# Patient Record
Sex: MALE | Race: OTHER | HISPANIC OR LATINO | Employment: FULL TIME | URBAN - METROPOLITAN AREA
[De-identification: names, ages, dates, MRNs, and addresses within clinical notes are randomized per-mention and may not be internally consistent; named-entity substitution may affect disease eponyms.]

---

## 2023-10-30 ENCOUNTER — APPOINTMENT (OUTPATIENT)
Dept: RADIOLOGY | Facility: IMAGING CENTER | Age: 33
End: 2023-10-30
Attending: FAMILY MEDICINE
Payer: COMMERCIAL

## 2023-10-30 ENCOUNTER — OFFICE VISIT (OUTPATIENT)
Dept: URGENT CARE | Facility: CLINIC | Age: 33
End: 2023-10-30
Payer: COMMERCIAL

## 2023-10-30 VITALS
RESPIRATION RATE: 13 BRPM | SYSTOLIC BLOOD PRESSURE: 108 MMHG | WEIGHT: 247.3 LBS | TEMPERATURE: 97.5 F | DIASTOLIC BLOOD PRESSURE: 70 MMHG | HEART RATE: 64 BPM | BODY MASS INDEX: 36.63 KG/M2 | OXYGEN SATURATION: 100 % | HEIGHT: 69 IN

## 2023-10-30 DIAGNOSIS — M62.838 TRAPEZIUS MUSCLE SPASM: ICD-10-CM

## 2023-10-30 DIAGNOSIS — M25.511 ACUTE PAIN OF RIGHT SHOULDER: ICD-10-CM

## 2023-10-30 PROCEDURE — 73030 X-RAY EXAM OF SHOULDER: CPT | Mod: TC,RT | Performed by: RADIOLOGY

## 2023-10-30 PROCEDURE — 3078F DIAST BP <80 MM HG: CPT | Performed by: FAMILY MEDICINE

## 2023-10-30 PROCEDURE — 3074F SYST BP LT 130 MM HG: CPT | Performed by: FAMILY MEDICINE

## 2023-10-30 PROCEDURE — 99203 OFFICE O/P NEW LOW 30 MIN: CPT | Performed by: FAMILY MEDICINE

## 2023-10-30 RX ORDER — CYCLOBENZAPRINE HCL 10 MG
10 TABLET ORAL 3 TIMES DAILY PRN
Qty: 20 TABLET | Refills: 0 | Status: SHIPPED | OUTPATIENT
Start: 2023-10-30

## 2023-10-30 NOTE — LETTER
Called and spoke with patient. Informed him of the below results and information. Patient verbalized understanding.    October 30, 2023         Patient: Lizandro Vences   YOB: 1990   Date of Visit: 10/30/2023           To Whom it May Concern:    Lizandro Vences was seen in my clinic on 10/30/2023. Please excuse from work today.    Sincerely,           Maurice Mane M.D.  Electronically Signed

## 2023-10-31 ASSESSMENT — ENCOUNTER SYMPTOMS
WEIGHT LOSS: 0
MYALGIAS: 0
EYE DISCHARGE: 0
VOMITING: 0
NAUSEA: 0
EYE REDNESS: 0

## 2023-11-01 NOTE — PROGRESS NOTES
"Subjective     Lizandro Vences is a 32 y.o. male who presents with Neck Pain (Patient states that neck and shoulder pain have been going on for about 3 days. States that he thought it was due to how he sleeps. This morning his shoulder was hurting a lot and was unable to go to work. ) and Shoulder Pain            3 days right neck and shoulder pain.  Moderate to severe.  Worse with movement of head and right upper extremity.  No trauma.  No fever.  No myelopathy symptoms.  No prior injury or surgery.  No other aggravating alleviating factors.        Review of Systems   Constitutional:  Negative for malaise/fatigue and weight loss.   Eyes:  Negative for discharge and redness.   Gastrointestinal:  Negative for nausea and vomiting.   Musculoskeletal:  Negative for joint pain and myalgias.   Skin:  Negative for itching and rash.              Objective     /70   Pulse 64   Temp 36.4 °C (97.5 °F) (Temporal)   Resp 13   Ht 1.753 m (5' 9\")   Wt 112 kg (247 lb 4.8 oz)   SpO2 100%   BMI 36.52 kg/m²      Physical Exam  Constitutional:       General: He is not in acute distress.     Appearance: He is well-developed.   HENT:      Head: Normocephalic and atraumatic.   Eyes:      Conjunctiva/sclera: Conjunctivae normal.   Neck:      Comments: Tender and spasm right paracervical and trapezius musculature.  No midline bony tenderness or step-off.  Pain reproduced with right head rotation.  Pulmonary:      Breath sounds: Normal breath sounds.   Musculoskeletal:      Cervical back: Normal range of motion and neck supple.      Comments: Right shoulder diffusely tender.  No deformity.  Pain reproduced with abduction past 90 degrees.  Distal neurovascular intact.   Skin:     General: Skin is warm and dry.      Findings: No rash.   Neurological:      Mental Status: He is alert.                             Assessment & Plan      Xray: negative per radiology    1. Acute pain of right shoulder    - DX-SHOULDER 2+ RIGHT; " Future    2. Trapezius muscle spasm    - cyclobenzaprine (FLEXERIL) 10 mg Tab; Take 1 Tablet by mouth 3 times a day as needed for Muscle Spasms.  Dispense: 20 Tablet; Refill: 0    Differential diagnosis, natural history, supportive care, and indications for immediate follow-up were discussed.

## 2023-11-14 ENCOUNTER — OCCUPATIONAL MEDICINE (OUTPATIENT)
Dept: URGENT CARE | Facility: CLINIC | Age: 33
End: 2023-11-14
Payer: OTHER MISCELLANEOUS

## 2023-11-14 VITALS
DIASTOLIC BLOOD PRESSURE: 64 MMHG | HEIGHT: 69 IN | OXYGEN SATURATION: 98 % | HEART RATE: 62 BPM | RESPIRATION RATE: 16 BRPM | BODY MASS INDEX: 36.58 KG/M2 | WEIGHT: 247 LBS | TEMPERATURE: 97.3 F | SYSTOLIC BLOOD PRESSURE: 116 MMHG

## 2023-11-14 DIAGNOSIS — M62.838 TRAPEZIUS MUSCLE SPASM: ICD-10-CM

## 2023-11-14 DIAGNOSIS — S46.912D SHOULDER STRAIN, LEFT, SUBSEQUENT ENCOUNTER: ICD-10-CM

## 2023-11-14 PROCEDURE — 3078F DIAST BP <80 MM HG: CPT | Performed by: PHYSICIAN ASSISTANT

## 2023-11-14 PROCEDURE — 99213 OFFICE O/P EST LOW 20 MIN: CPT | Performed by: PHYSICIAN ASSISTANT

## 2023-11-14 PROCEDURE — 3074F SYST BP LT 130 MM HG: CPT | Performed by: PHYSICIAN ASSISTANT

## 2023-11-14 ASSESSMENT — ENCOUNTER SYMPTOMS: NECK PAIN: 1

## 2023-11-14 NOTE — PROGRESS NOTES
"Hernandez Vences is a 32 y.o. male who presents with Neck Pain (WC NEW, DOI 10/28/23)      Initial DOI 10/28/2023: Patient states he was lifting overweight packages and causing discomfort to his neck.  Patient was seen at regular urgent care visit on October 30 and had negative x-rays at that time.  He had most relaxer prescribed at that time and states he has been noticing some relief with.  Has not been taking any other medication at this time.     HPI  Patient presents today for evaluation of work-related injury as described above.  Review of Systems   Musculoskeletal:  Positive for neck pain.     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   Constitutional:       General: He is not in acute distress.     Appearance: Normal appearance. He is well-developed. He is not ill-appearing or toxic-appearing.   HENT:      Head: Normocephalic and atraumatic.      Right Ear: Hearing normal.      Left Ear: Hearing normal.   Cardiovascular:      Rate and Rhythm: Normal rate.   Pulmonary:      Effort: Pulmonary effort is normal.   Musculoskeletal:        Arms:       Comments: As described below.   Skin:     General: Skin is warm and dry.   Neurological:      Mental Status: He is alert.      Coordination: Coordination normal.   Psychiatric:         Mood and Affect: Mood normal.         Patient has pain with movement of the right upper extremity above shoulder height.  Neurovascular intact distally.  5 of 5  strength.  Tenderness to palpation to the right trapezius area.  Negative empty can test.            Assessment & Plan   1. " Trapezius muscle spasm      2. Shoulder strain, left, subsequent encounter    Would suggest light duty restrictions for the next week.  Would recommend use over-the-counter ibuprofen or Tylenol per 's instructions as well as heat or ice to the area intermittently.  Would also suggest topical IcyHot or Salonpas patches.  Gentle stretching and range of motion exercises recommended.  Would suggest avoiding any use of the right upper extremity above shoulder height and avoid lifting anything over 10 pounds.  Follow-up in 1 week for reevaluation at that time.     Please note that this dictation was created using voice recognition software. I have made every reasonable attempt to correct obvious errors, but I expect that there may be errors of grammar and possibly content that I did not discover before finalizing the note.

## 2023-11-14 NOTE — LETTER
[facility] [place] [address] [facility] [address] [place] [address]  Phone:  [phone] - Fax:  [phone]   [facility] Progress Report and Disability Certification  Date of Service: 2023   No Show:  No  Date / Time of Next Visit: 2023   Claim Information   Patient Name: Lizandro Vences  Claim Number:     Employer: [employer]  Date of Injury: 10/28/2023     Insurer / TPA: [employer]  ID / SSN:     Occupation: RCA  Diagnosis: Diagnoses of Trapezius muscle spasm and Shoulder strain, left, subsequent encounter were pertinent to this visit.    Medical Information   Related to Industrial Injury? Yes    Subjective Complaints:  Initial DOI 10/28/2023: Patient states he was lifting overweight packages and causing discomfort to his neck.  Patient was seen at regular urgent care visit on  and had negative x-rays at that time.  He had most relaxer prescribed at that time and states he has been noticing some relief with.  Has not been taking any other medication at this time.   Objective Findings: Patient has pain with movement of the right upper extremity above shoulder height.  Neurovascular intact distally.  5 of 5  strength.  Tenderness to palpation to the right trapezius area.  Negative empty can test.     Pre-Existing Condition(s):     Assessment:   Initial Visit    Status: Additional Care Required  Permanent Disability:No    Plan:      Diagnostics:      Comments:       Disability Information   Status: Released to Restricted Duty    From:  2023  Through: 2023 Restrictions are: Temporary   Physical Restrictions   Sitting:    Standing:    Stooping:    Bending:      Squatting:    Walking:    Climbing:    Pushin hrs/day   Pullin hrs/day Other:    Reaching Above Shoulder (L):   Reaching Above Shoulder (R):       Reaching Below Shoulder (L):    Reaching Below Shoulder (R):      Not to exceed Weight Limits   Carrying(hrs):   Weight  Limit(lb): < or = to 10 pounds Lifting(hrs):   Weight  Limit(lb): < or = to 10 pounds   Comments: Would suggest light duty restrictions for the next week.  Would recommend use over-the-counter ibuprofen or Tylenol per 's instructions as well as heat or ice to the area intermittently.  Would also suggest topical IcyHot or Salonpas patches.  Gentle stretching and range of motion exercises recommended.  Would suggest avoiding any use of the right upper extremity above shoulder height and avoid lifting anything over 10 pounds.  Follow-up in 1 week for reevaluation at that time.    Repetitive Actions   Hands: i.e. Fine Manipulations from Grasping:     Feet: i.e. Operating Foot Controls:     Driving / Operate Machinery:     Health Care Provider’s Original or Electronic Signature  Ameya Pereira P.A.-C. Health Care Provider’s Original or Electronic Signature    Levi Lyons DO MPH     Clinic Name / Location: [address] [place] [address] Clinic Phone Number: Dept: [phone]   Appointment Time: 1:45 Pm Visit Start Time: 2:38 PM   Check-In Time:  1:52 Pm Visit Discharge Time:  3:10 PM   Original-Treating Physician or Chiropractor    Page 2-Insurer/TPA    Page 3-Employer    Page 4-Employee

## 2023-11-14 NOTE — LETTER
"    EMPLOYEE’S CLAIM FOR COMPENSATION/ REPORT OF INITIAL TREATMENT  FORM C-4  PLEASE TYPE OR PRINT    EMPLOYEE’S CLAIM - PROVIDE ALL INFORMATION REQUESTED   First Name                    DIEGO Bone Last Name  Ru Birthdate                    1990                Sex  []M  []F Claim Number (Insurer’s Use Only)     Home Address  [address] Age  32 y.o. 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 (if applicable)  Lifting package    How did this injury or occupational disease occur? (Be specific and answer in detail. Use additional sheet if necessary)  Lifting overwith package   If you believe that you have an occupational disease, when did you first have knowledge of the disability and its relationship to your employment?  NA Witnesses to the Accident (if applicable)  NO      Nature of Injury or Occupational Disease  Workers' Compensation  Part(s) of Body Injured or Affected  Soft Tissue - Neck Spinal Cord - Neck N/A    I CERTIFY THAT THE ABOVE IS TRUE AND CORRECT TO T HE BEST OF " MY KNOWLEDGE AND THAT I HAVE PROVIDED THIS INFORMATION IN ORDER TO OBTAIN THE BENEFITS OF [place], OR [place]).  I HEREBY AUTHORIZE ANY PHYSICIAN, CHIROPRACTOR, SURGEON, PRACTITIONER OR ANY OTHER PERSON, ANY HOSPITAL, INCLUDING [facility] OR [facility], ANY  MEDICAL SERVICE ORGANIZATION, ANY INSURANCE COMPANY, OR OTHER INSTITUTION OR ORGANIZATION TO RELEASE TO EACH OTHER, ANY MEDICAL OR OTHER INFORMATION, INCLUDING BENEFITS PAID OR PAYABLE, PERTINENT TO THIS INJURY OR DISEASE, EXCEPT INFORMATION RELATIVE TO DIAGNOSIS, TREATMENT AND/OR COUNSELING FOR AIDS, PSYCHOLOGICAL CONDITIONS, ALCOHOL OR CONTROLLED SUBSTANCES, FOR WHICH I MUST GIVE SPECIFIC AUTHORIZATION.  A PHOTOSTAT OF THIS AUTHORIZATION SHALL BE VALID AS THE ORIGINAL.     Date   Place Employee’s Original or  *Electronic Signature   THIS REPORT MUST BE COMPLETED AND MAILED WITHIN 3 WORKING DAYS OF TREATMENT   Place  [facility]    Name of [facility]   Date 11/14/2023 Diagnosis and Description of Injury or Occupational Disease  (M62.838) Trapezius muscle spasm  (S46.912D) Shoulder strain, left, subsequent encounter  Diagnoses of Trapezius muscle spasm and Shoulder strain, left, subsequent encounter were pertinent to this visit. Is there evidence that the injured employee was under the influence of alcohol and/or another controlled substance at the time of accident?  []No  [] Yes (if yes, please explain)   Hour 2:38 PM  No   Treatment: Would suggest light duty restrictions for the next week.  Would recommend use over-the-counter ibuprofen or Tylenol per 's instructions as well as heat or ice to the area intermittently.  Would also suggest topical IcyHot or Salonpas patches.  Gentle stretching and range of motion exercises recommended.  Would suggest avoiding any use of the right upper extremity above shoulder height  and avoid lifting anything over 10 pounds.  Follow-up in 1 week for reevaluation at that time.    Have you advised the patient to remain off work five days or more?   [] Yes Indicate dates: From   To    []No If no, is the injured employee capable of: [] full duty [] modified duty                                                             No  Yes  If modified duty, specify any limitations / restrictions:                                                                                                                                                                                                                                                                                                                                                                                                                  X-Ray Findings: Negative    From information given by the employee, together with medical evidence, can you directly connect this injury or occupational disease as job incurred?  []Yes   [] No Yes    Is additional medical care by a physician indicated? []Yes [] No  Yes    Do you know of any previous injury or disease contributing to this condition or occupational disease? []Yes [] No (Explain if yes)                          No   Date  11/14/2023 Print Health Care Provider’s Name  Christopher Pereira P.A.-C. I certify that the employer’s copy of  this form was delivered to the employer on:   Address  [address] [place] [address] INSURER'S USE ONLY                       [facility]  [address] Provider’s Tax ID Number  [id number]   Telephone  Dept: [phone]    Health Care Provider’s Original or Electronic Signature  e-CHRISTOPHER Morton P.A.-C. Degree (MD,DO, DC,PAIbethC,APRN)  PAHILDA  Choose (if applicable)      ORIGINAL - TREATING HEALTHCARE PROVIDER PAGE 2 - INSURER/TPA PAGE 3 - EMPLOYER PAGE 4 - EMPLOYEE             Form C-4 (rev.08/23)        BRIEF DESCRIPTION OF RIGHTS AND BENEFITS  (Pursuant to [place] 616[place])    Notice  "of Injury or Occupational Disease (Incident Report Form C-1): If an injury or occupational disease (OD) arises out of and in the course of employment, you must provide written notice to your employer as soon as practicable, but no later than 7 days after the accident or OD. Your employer shall maintain a sufficient supply of the required forms.    Claim for Compensation (Form C-4): If medical treatment is sought, the form C-4 is available at the place of initial treatment. A completed \"Claim for Compensation\" (Form C-4) must be filed within 90 days after an accident or OD. The treating physician or chiropractor must, within 3 working days after treatment, complete and mail to the employer, the employer's insurer and third-party , the Claim for Compensation.    Medical Treatment: If you require medical treatment for your on-the-job injury or OD, you may be required to select a physician or chiropractor from a list provided by your workers’ compensation insurer, if it has contracted with an Organization for Managed Care (MCO) or Preferred Provider Organization (PPO) or providers of health care. If your employer has not entered into a contract with an MCO or PPO, you may select a physician or chiropractor from the Panel of Physicians and Chiropractors. Any medical costs related to your industrial injury or OD will be paid by your insurer.    Temporary Total Disability (TTD): If your doctor has certified that you are unable to work for a period of at least 5 consecutive days, or 5 cumulative days in a 20-day period, or places restrictions on you that your employer does not accommodate, you may be entitled to TTD compensation.    Temporary Partial Disability (TPD): If the wage you receive upon reemployment is less than the compensation for TTD to which you are entitled, the insurer may be required to pay you TPD compensation to make up the difference. TPD can only be paid for a maximum of 24 " months.    Permanent Partial Disability (PPD): When your medical condition is stable and there is an indication of a PPD as a result of your injury or OD, within 30 days, your insurer must arrange for an evaluation by a rating physician or chiropractor to determine the degree of your PPD. The amount of your PPD award depends on the date of injury, the results of the PPD evaluation, your age and wage.    Permanent Total Disability (PTD): If you are medically certified by a treating physician or chiropractor as permanently and totally disabled and have been granted a PTD status by your insurer, you are entitled to receive monthly benefits not to exceed 66 2/3% of your average monthly wage. The amount of your PTD payments is subject to reduction if you previously received a lump-sum PPD award.    Vocational Rehabilitation Services: You may be eligible for vocational rehabilitation services if you are unable to return to the job due to a permanent physical impairment or permanent restrictions as a result of your injury or occupational disease.    Transportation and Per David Reimbursement: You may be eligible for travel expenses and per david associated with medical treatment.    Reopening: You may be able to reopen your claim if your condition worsens after claim closure.     Appeal Process: If you disagree with a written determination issued by the insurer or the insurer does not respond to your request, you may appeal to the Department of Administration, , by following the instructions contained in your determination letter. You must appeal the determination within 70 days from the date of the determination letter at [address], [address], [place] [address], or [address], [place] [address]. If you disagree with the  decision, you may appeal to the Department of Administration, . You must file your appeal within 30 days from the  date of the  decision letter at [address], [address], [place], [place] [address], or [address], [address], [place], [place] [address]. If you disagree with a decision of an , you may file a petition for judicial review with the District Court. You must do so within 30 days of the Appeal Officer’s decision. You may be represented by an  at your own expense or you may contact the [facility] for possible representation.    [place]  for Injured Workers (NAIW): If you disagree with a  decision, you may request that NAIW represent you without charge at an  Hearing. For information regarding denial of benefits, you may contact the [facility] at: [address], [address], [place] [address], [phone], or [address], [place] [address], [phone]    To File a Complaint with the Division: If you wish to file a complaint with the  of the Division of Industrial Relations (DIR),  please contact the Workers’ Compensation Section, [address], [address], [place], [place] [address], telephone (465) [phone], or [address], [place], [place] [address], telephone [phone]) [phone].    For assistance with Workers’ Compensation Issues: You may contact the [facility] Office for Consumer Health Assistance, [address], [address], [place] [address], Toll Free 1-270.848.9985, Web site: http://[facility].[place].gov/Programs/ELIESER E-mail: elieser@[facility].[place].gov              __________________________________________________________________                                    _________________            Employee Name / Signature                                                                                                                            Date                                                                                                                                                                                                                               D-2 (rev. 10/20)

## 2023-12-16 ENCOUNTER — OFFICE VISIT (OUTPATIENT)
Dept: URGENT CARE | Facility: CLINIC | Age: 33
End: 2023-12-16
Payer: COMMERCIAL

## 2023-12-16 VITALS
TEMPERATURE: 97.4 F | HEART RATE: 82 BPM | DIASTOLIC BLOOD PRESSURE: 70 MMHG | OXYGEN SATURATION: 96 % | BODY MASS INDEX: 37.18 KG/M2 | WEIGHT: 251 LBS | SYSTOLIC BLOOD PRESSURE: 112 MMHG | HEIGHT: 69 IN | RESPIRATION RATE: 16 BRPM

## 2023-12-16 DIAGNOSIS — R50.9 FEVER, UNSPECIFIED FEVER CAUSE: ICD-10-CM

## 2023-12-16 DIAGNOSIS — H66.001 ACUTE SUPPURATIVE OTITIS MEDIA OF RIGHT EAR WITHOUT SPONTANEOUS RUPTURE OF TYMPANIC MEMBRANE, RECURRENCE NOT SPECIFIED: ICD-10-CM

## 2023-12-16 DIAGNOSIS — R52 GENERALIZED BODY ACHES: ICD-10-CM

## 2023-12-16 PROCEDURE — 3078F DIAST BP <80 MM HG: CPT | Performed by: PHYSICIAN ASSISTANT

## 2023-12-16 PROCEDURE — 3074F SYST BP LT 130 MM HG: CPT | Performed by: PHYSICIAN ASSISTANT

## 2023-12-16 PROCEDURE — 99213 OFFICE O/P EST LOW 20 MIN: CPT | Performed by: PHYSICIAN ASSISTANT

## 2023-12-16 RX ORDER — AMOXICILLIN 500 MG/1
500 CAPSULE ORAL 2 TIMES DAILY
Qty: 14 CAPSULE | Refills: 0 | Status: SHIPPED | OUTPATIENT
Start: 2023-12-16 | End: 2023-12-23

## 2023-12-16 NOTE — PROGRESS NOTES
"Subjective     Lizandro Vences is a 33 y.o. male who presents with Otalgia (PT has trouble hearing from (R) ear, body aches, fatigue, dry cough, dry nose x 1 week )    PMH:  has no past medical history on file.  MEDS:   Current Outpatient Medications:     cyclobenzaprine (FLEXERIL) 10 mg Tab, Take 1 Tablet by mouth 3 times a day as needed for Muscle Spasms., Disp: 20 Tablet, Rfl: 0  ALLERGIES:   Allergies   Allergen Reactions    Shrimp Flavor Swelling     Patient states a shrimp allergy.      SURGHX: History reviewed. No pertinent surgical history.  SOCHX:  reports that he has never smoked. He has never used smokeless tobacco. He reports current alcohol use. He reports that he does not currently use drugs.  FH: Reviewed with patient, not pertinent to this visit.           Patient presents with:  Otalgia: PT has trouble hearing from (R) ear, body aches, fatigue, dry cough, dry nose x 1 week         Otalgia   There is pain in the right ear. This is a new problem. The current episode started in the past 7 days. The problem occurs constantly. The problem has been unchanged. There has been no fever. Associated symptoms include coughing and headaches. Pertinent negatives include no abdominal pain, diarrhea, hearing loss, rhinorrhea or sore throat. He has tried NSAIDs and heat packs for the symptoms. The treatment provided no relief.       Review of Systems   Constitutional:  Negative for chills and fever.   HENT:  Positive for ear pain. Negative for hearing loss, rhinorrhea and sore throat.    Respiratory:  Positive for cough.    Gastrointestinal:  Negative for abdominal pain and diarrhea.   Neurological:  Positive for headaches.   All other systems reviewed and are negative.             Objective     /70 (BP Location: Right arm, Patient Position: Sitting, BP Cuff Size: Large adult)   Pulse 82   Temp 36.3 °C (97.4 °F) (Temporal)   Resp 16   Ht 1.753 m (5' 9\")   Wt 114 kg (251 lb)   SpO2 96%   BMI 37.07 kg/m²  "     Physical Exam  Vitals and nursing note reviewed.   Constitutional:       General: He is not in acute distress.     Appearance: Normal appearance. He is well-developed, well-groomed and normal weight. He is not toxic-appearing.   HENT:      Head: Normocephalic and atraumatic.      Right Ear: A middle ear effusion is present. Tympanic membrane is injected and retracted.      Left Ear: Tympanic membrane and ear canal normal.      Nose: Congestion present. No rhinorrhea.      Mouth/Throat:      Lips: Pink.      Mouth: Mucous membranes are moist.      Pharynx: Uvula midline. Posterior oropharyngeal erythema present. No oropharyngeal exudate.   Eyes:      Extraocular Movements: Extraocular movements intact.      Conjunctiva/sclera: Conjunctivae normal.      Pupils: Pupils are equal, round, and reactive to light.   Cardiovascular:      Rate and Rhythm: Normal rate and regular rhythm.      Heart sounds: Normal heart sounds.   Pulmonary:      Effort: Pulmonary effort is normal.      Breath sounds: Normal breath sounds.   Abdominal:      Palpations: Abdomen is soft.   Musculoskeletal:         General: Normal range of motion.      Cervical back: Normal range of motion and neck supple.   Skin:     General: Skin is warm and dry.      Capillary Refill: Capillary refill takes less than 2 seconds.   Neurological:      General: No focal deficit present.      Mental Status: He is alert and oriented to person, place, and time.      Gait: Gait normal.   Psychiatric:         Mood and Affect: Mood normal.         Behavior: Behavior is cooperative.                             Assessment & Plan              1. Acute suppurative otitis media of right ear without spontaneous rupture of tympanic membrane, recurrence not specified  amoxicillin (AMOXIL) 500 MG Cap      2. Fever, unspecified fever cause  amoxicillin (AMOXIL) 500 MG Cap      3. Generalized body aches  amoxicillin (AMOXIL) 500 MG Cap         PT HPI and PE are consistent with   acute otitis media of right ear.  I will treat with amoxicillin, bid x 7 days.     Motrin/Advil/Ibuprophen 600 mg every 6 hours as needed for pain or fever.    PT advised saltwater gargles/swishes  3-4 times daily until symptoms improve.     Differential diagnosis, supportive care, and indications for immediate follow-up discussed with patient.  Instructed to return to clinic or nearest emergency department for any change in condition, further concerns, or worsening of symptoms.    I personally reviewed prior external notes and test results pertinent to today's visit.  I have independently reviewed and interpreted all diagnostics ordered during this urgent care visit.    PT should follow up with PCP in 1-2 days for re-evaluation if symptoms have not improved.      Discussed red flags and reasons to return to UC or ED.      Pt and/or family verbalized understanding of diagnosis and follow up instructions and was offered informational handout on diagnosis.  PT discharged.     Please note that this dictation was created using voice recognition software. I have made every reasonable attempt to correct obvious errors, but I expect that there may be errors of grammar and possibly content that I did not discover before finalizing the note.

## 2023-12-16 NOTE — LETTER
December 16, 2023         Patient: Lizandro Vences   YOB: 1990   Date of Visit: 12/16/2023           To Whom it May Concern:    Lizandro Vences was seen in my clinic on 12/16/2023. He may return to work on 12/18/2023.    If you have any questions or concerns, please don't hesitate to call.        Sincerely,           Mary Hilton P.A.-C.  Electronically Signed

## 2023-12-22 ASSESSMENT — ENCOUNTER SYMPTOMS
CHILLS: 0
ABDOMINAL PAIN: 0
HEADACHES: 1
COUGH: 1
FEVER: 0
SORE THROAT: 0
RHINORRHEA: 0
DIARRHEA: 0

## 2024-01-03 ENCOUNTER — OFFICE VISIT (OUTPATIENT)
Dept: URGENT CARE | Facility: CLINIC | Age: 34
End: 2024-01-03
Payer: COMMERCIAL

## 2024-01-03 ENCOUNTER — APPOINTMENT (OUTPATIENT)
Dept: RADIOLOGY | Facility: IMAGING CENTER | Age: 34
End: 2024-01-03
Attending: FAMILY MEDICINE
Payer: COMMERCIAL

## 2024-01-03 VITALS
OXYGEN SATURATION: 97 % | BODY MASS INDEX: 36.73 KG/M2 | SYSTOLIC BLOOD PRESSURE: 124 MMHG | DIASTOLIC BLOOD PRESSURE: 62 MMHG | HEIGHT: 69 IN | WEIGHT: 248 LBS | HEART RATE: 86 BPM | RESPIRATION RATE: 14 BRPM | TEMPERATURE: 98.9 F

## 2024-01-03 DIAGNOSIS — M62.838 TRAPEZIUS MUSCLE SPASM: ICD-10-CM

## 2024-01-03 DIAGNOSIS — M25.511 ACUTE PAIN OF RIGHT SHOULDER: ICD-10-CM

## 2024-01-03 PROCEDURE — 3078F DIAST BP <80 MM HG: CPT | Performed by: FAMILY MEDICINE

## 2024-01-03 PROCEDURE — 3074F SYST BP LT 130 MM HG: CPT | Performed by: FAMILY MEDICINE

## 2024-01-03 PROCEDURE — 99213 OFFICE O/P EST LOW 20 MIN: CPT | Performed by: FAMILY MEDICINE

## 2024-01-03 NOTE — PROGRESS NOTES
"  Subjective:      33 y.o. male presents to urgent care for right shoulder pain that started 2 weeks ago. There was no inciting event or trauma at that time. The pain is intermittent, it comes with use, it's described as sharp, currently rated 10/10. He has been using Tylenol without significant relief in symptoms. He is right hand dominant.     He denies any other questions or concerns at this time.    Current problem list, medication, and past medical/surgical history were reviewed in Epic.    ROS  See HPI     Objective:      /62 (BP Location: Left arm, Patient Position: Sitting)   Pulse 86   Temp 37.2 °C (98.9 °F) (Temporal)   Resp 14   Ht 1.753 m (5' 9\")   Wt 112 kg (248 lb)   SpO2 97%   BMI 36.62 kg/m²     Physical Exam  Constitutional:       General: He is not in acute distress.     Appearance: He is not diaphoretic.   Cardiovascular:      Rate and Rhythm: Normal rate and regular rhythm.      Heart sounds: Normal heart sounds.   Pulmonary:      Effort: Pulmonary effort is normal. No respiratory distress.      Breath sounds: Normal breath sounds.   Musculoskeletal:      Comments: No discolorations or deformities noted inspection of right shoulder.  He is tender to palpation along his right trapezius.  No pain to palpation of right shoulder joint.  Active ROM remains to right shoulder.   Neurological:      Mental Status: He is alert.   Psychiatric:         Mood and Affect: Affect normal.         Judgment: Judgment normal.       Assessment/Plan:     1. Trapezius muscle spasm  No mechanism.  No need for imaging.  We reviewed exercises to do at least 2 times daily.  He was encouraged to use heat, Tylenol, and ibuprofen as needed.  Already has Flexeril at home.    Instructed to return to Urgent Care or nearest Emergency Department if symptoms fail to improve, for any change in condition, further concerns, or new concerning symptoms. Patient states understanding of the plan of care and discharge " instructions.    Reina Neely M.D.

## 2024-01-03 NOTE — LETTER
January 3, 2024    To Whom It May Concern:         This is confirmation that Lizandro Ru attended his scheduled appointment with Reina Neely M.D. on 1/03/24. He may return to work 1/5/2024 without any restrictions.          If you have any questions please do not hesitate to call me at the phone number listed below.    Sincerely,          Reina Neely M.D.  [phone]

## 2024-01-17 ENCOUNTER — APPOINTMENT (OUTPATIENT)
Dept: RADIOLOGY | Facility: MEDICAL CENTER | Age: 34
End: 2024-01-17
Attending: EMERGENCY MEDICINE
Payer: COMMERCIAL

## 2024-01-17 ENCOUNTER — HOSPITAL ENCOUNTER (EMERGENCY)
Facility: MEDICAL CENTER | Age: 34
End: 2024-01-17
Attending: EMERGENCY MEDICINE
Payer: COMMERCIAL

## 2024-01-17 VITALS
BODY MASS INDEX: 37.31 KG/M2 | OXYGEN SATURATION: 99 % | SYSTOLIC BLOOD PRESSURE: 138 MMHG | HEART RATE: 82 BPM | DIASTOLIC BLOOD PRESSURE: 80 MMHG | WEIGHT: 252.65 LBS | TEMPERATURE: 97.8 F | RESPIRATION RATE: 16 BRPM

## 2024-01-17 DIAGNOSIS — G89.29 CHRONIC RIGHT SHOULDER PAIN: ICD-10-CM

## 2024-01-17 DIAGNOSIS — M25.511 CHRONIC RIGHT SHOULDER PAIN: ICD-10-CM

## 2024-01-17 PROCEDURE — 700111 HCHG RX REV CODE 636 W/ 250 OVERRIDE (IP): Performed by: EMERGENCY MEDICINE

## 2024-01-17 PROCEDURE — 99283 EMERGENCY DEPT VISIT LOW MDM: CPT

## 2024-01-17 PROCEDURE — 73030 X-RAY EXAM OF SHOULDER: CPT | Mod: RT

## 2024-01-17 RX ORDER — METHYLPREDNISOLONE 4 MG/1
TABLET ORAL
Qty: 1 EACH | Refills: 0 | Status: SHIPPED | OUTPATIENT
Start: 2024-01-17

## 2024-01-17 RX ORDER — PREDNISONE 20 MG/1
20 TABLET ORAL ONCE
Status: COMPLETED | OUTPATIENT
Start: 2024-01-17 | End: 2024-01-17

## 2024-01-17 RX ADMIN — PREDNISONE 20 MG: 20 TABLET ORAL at 15:30

## 2024-01-17 ASSESSMENT — LIFESTYLE VARIABLES: DO YOU DRINK ALCOHOL: NO

## 2024-01-17 NOTE — ED NOTES
Pt ambulatory to chair, pt reports pain happening mostly every night and waking him up from sleep, pt reports pain improves after a couple of hours being awake.  Pt has full ROM and CMS intact.

## 2024-01-17 NOTE — ED TRIAGE NOTES
Pt comes in complaining of R shoulder pain/arm pain for approx 2 weeks only at night. Pt stating at night the arm hurts from the shoulder to the elbow. Pt has been seen at urgent care but no relief from treatment

## 2024-01-17 NOTE — ED PROVIDER NOTES
Emergency Physician Note    Chief Concern:  Chief Complaint   Patient presents with    Arm Pain     HPI/ROS      External Records Reviewed:  Outpatient clinic notes reviewed: Mr. Vences was seen in urgent care 1/3/2024, family medicine physician note reviewed from that visit.  He was seen for evaluation of right shoulder pain that began about 2 weeks prior to his visit.  No swelling or deformity noted on physical exam, he did have some tenderness along the right trapezius documented.  He was diagnosed with trapezius muscle spasm, no indication for imaging, plan was for conservative management including some exercises at home, and Tylenol and ibuprofen as needed.    HPI:  Lizandro Vences is a 33 y.o. male who presents to the emergency department today for evaluation of right shoulder pain.  Symptoms again about 2 to 3 weeks ago, he describes pain localized to the right shoulder radiating towards the proximal humerus, though he does not report any associated right elbow pain or right wrist pain.  He states about a week before his pain began he fell at work, though did not seem to have any significant injury at that time, and did not have any pain until 1 week later.  He does have full range of motion of the shoulder, and movement does exacerbate this pain, but range of motion is not limited.  He reports no associated swelling, no fevers, no paresthesias.  He was seen at urgent care, and has been trying to treat the pain with Tylenol ibuprofen, however this has not been helping.  He does not report any prior imaging.  He has no significant past medical history, no prior injuries to the right extremity.      PAST MEDICAL HISTORY  History reviewed. No pertinent past medical history.    SURGICAL HISTORY  History reviewed. No pertinent surgical history.    FAMILY HISTORY  No family history noted.    SOCIAL HISTORY   reports that he has never smoked. He has never used smokeless tobacco. He reports current alcohol use. He  reports that he does not currently use drugs.    CURRENT MEDICATIONS  Discharge Medication List as of 1/17/2024  3:49 PM        CONTINUE these medications which have NOT CHANGED    Details   cyclobenzaprine (FLEXERIL) 10 mg Tab Take 1 Tablet by mouth 3 times a day as needed for Muscle Spasms., Disp-20 Tablet, R-0, Normal             ALLERGIES  Shrimp flavor    PHYSICAL EXAM  Vital Signs: /80   Pulse 82   Temp 36.6 °C (97.8 °F)   Resp 16   Wt 115 kg (252 lb 10.4 oz)   SpO2 99%   BMI 37.31 kg/m²   Constitutional: Alert, no acute distress  HENT: Atraumatic  Neck: Normal range of motion  Cardiovascular: Right upper extremity warm, well perfused  Pulmonary: Normal work of breathing  Skin: Right upper extremity with no lacerations nor abrasions  Musculoskeletal: Right upper extremity with full range of motion, no deformity, no significant pain with range of motion.  He does have some tenderness to palpation over the right humeral head, right wrist and right elbow are entirely nontender to palpation.  No edema appreciated.  Neurologic: Upper extremity with normal sensory and motor function    Diagnostic Studies & Procedures    Radiology:  I independently interpreted the x-ray of the right shoulder, no fracture appreciated, do not appreciate any evidence of calcific tendinitis.    DX-SHOULDER 2+ RIGHT   Final Result      No radiographic evidence of acute traumatic injury.          Course and Medical Decision Making    Initial Assessment and Plan:  Mr. Vences presents to the emergency department today for evaluation of right shoulder pain as documented above.  His work requires repetitive movements with the right upper extremity, which may be contributing.  He also had a fall about a week prior to the onset of pain, this is less consistent with fracture or dislocation, but may be a contributing factor to his soft tissue injury.  He has no evidence of neurovascular compromise, he has normal vital signs with no  abnormal warmth, nor decreased range of motion, no evidence of septic joint.  No right upper extremity swelling to suggest DVT.    Right shoulder x-ray demonstrates no evidence of acute traumatic injury.    At this time, I suspect his pain is likely due to soft tissue injury that we are not able to appreciate on imaging in the emergency department today.  Plan at this time is for treatment with a Medrol Dosepak to see if this gives him any relief.  He is referred to Dr. Chao, orthopedic surgeon on-call today for [facility].  He is provided with follow-up information, and referral was sent.  He will call tomorrow morning to schedule an appointment. Return precautions were provided in written form with the patient's discharge instructions.     Additional Problems and Disposition    Barriers to care at this time, including but not limited to:   1.  Patient is not established with a primary care physician, primary care follow-up order placed.    Prescription medication considerations and management:   1.  Medrol Dosepak prescribed    Disposition:  The patient will return for new or worsening symptoms and is stable at the time of discharge.    Patient will be discharged home in stable condition.    FOLLOW UP:  Bernardino Chao M.D.  [address] [facility] [id number] [phone]    Schedule an appointment as soon as possible for a visit       [facility], Emergency Dept  [address] [facility] [address] [phone]  Go to   If symptoms worsen      OUTPATIENT MEDICATIONS:  Discharge Medication List as of 1/17/2024  3:49 PM        START taking these medications    Details   methylPREDNISolone (MEDROL DOSEPAK) 4 MG Tablet Therapy Pack Use as directed, Disp-1 Each, R-0, Normal             FINAL IMPRESSION   1. Chronic right shoulder pain       I, Александр Rosales (Scribe), am scribing for, and in the presence of, Deb Calderon M.D..    Electronically signed by: Александр Rosales  (Scribe), 1/17/2024    Deb WILDE M.D. personally performed the services described in this documentation, as scribed by Александр Rosales in my presence, and it is both accurate and complete.      The note accurately reflects work and decisions made by me.  Deb Calderon M.D.  1/17/2024  10:24 PM

## 2024-01-17 NOTE — DISCHARGE INSTRUCTIONS
Please follow-up with [facility] for complete recheck.  You may take the steroids as prescribed.  Return to the emergency department if you develop any new or worsening symptoms including worsening pain, swelling, fevers, or if you have any further concerns.  Please call the clinic tomorrow to schedule your follow-up appointment.  Let them know you were seen in the emergency department and require an emergency department follow-up visit.

## 2024-01-18 ENCOUNTER — PATIENT OUTREACH (OUTPATIENT)
Dept: SCHEDULING | Facility: IMAGING CENTER | Age: 34
End: 2024-01-18
Payer: COMMERCIAL

## 2024-02-15 ENCOUNTER — HOSPITAL ENCOUNTER (EMERGENCY)
Facility: MEDICAL CENTER | Age: 34
End: 2024-02-15
Attending: STUDENT IN AN ORGANIZED HEALTH CARE EDUCATION/TRAINING PROGRAM
Payer: COMMERCIAL

## 2024-02-15 VITALS
SYSTOLIC BLOOD PRESSURE: 144 MMHG | RESPIRATION RATE: 14 BRPM | DIASTOLIC BLOOD PRESSURE: 78 MMHG | BODY MASS INDEX: 36.51 KG/M2 | WEIGHT: 246.47 LBS | TEMPERATURE: 97.8 F | HEIGHT: 69 IN | HEART RATE: 62 BPM | OXYGEN SATURATION: 98 %

## 2024-02-15 DIAGNOSIS — K64.4 EXTERNAL HEMORRHOID: ICD-10-CM

## 2024-02-15 DIAGNOSIS — K60.2 ANAL FISSURE: ICD-10-CM

## 2024-02-15 PROCEDURE — 99283 EMERGENCY DEPT VISIT LOW MDM: CPT

## 2024-02-15 PROCEDURE — A9270 NON-COVERED ITEM OR SERVICE: HCPCS | Performed by: STUDENT IN AN ORGANIZED HEALTH CARE EDUCATION/TRAINING PROGRAM

## 2024-02-15 PROCEDURE — 700102 HCHG RX REV CODE 250 W/ 637 OVERRIDE(OP): Performed by: STUDENT IN AN ORGANIZED HEALTH CARE EDUCATION/TRAINING PROGRAM

## 2024-02-15 RX ORDER — ACETAMINOPHEN 500 MG
1000 TABLET ORAL ONCE
Status: COMPLETED | OUTPATIENT
Start: 2024-02-15 | End: 2024-02-15

## 2024-02-15 RX ORDER — LIDOCAINE 5 G/100G
1 CREAM RECTAL; TOPICAL EVERY 4 HOURS PRN
Qty: 28 G | Refills: 0 | Status: SHIPPED | OUTPATIENT
Start: 2024-02-15

## 2024-02-15 RX ORDER — POLYETHYLENE GLYCOL 3350 17 G/17G
17 POWDER, FOR SOLUTION ORAL DAILY
Qty: 30 EACH | Refills: 0 | Status: SHIPPED | OUTPATIENT
Start: 2024-02-15

## 2024-02-15 RX ORDER — HYDROCORTISONE ACETATE 25 MG/1
25 SUPPOSITORY RECTAL EVERY 12 HOURS
Qty: 12 SUPPOSITORY | Refills: 0 | Status: SHIPPED | OUTPATIENT
Start: 2024-02-15

## 2024-02-15 RX ORDER — IBUPROFEN 600 MG/1
600 TABLET ORAL ONCE
Status: COMPLETED | OUTPATIENT
Start: 2024-02-15 | End: 2024-02-15

## 2024-02-15 RX ADMIN — IBUPROFEN 600 MG: 600 TABLET, FILM COATED ORAL at 16:00

## 2024-02-15 RX ADMIN — ACETAMINOPHEN 1000 MG: 500 TABLET ORAL at 15:59

## 2024-02-15 NOTE — ED PROVIDER NOTES
ED Provider Note    CHIEF COMPLAINT  Chief Complaint   Patient presents with    Hemorrhoids     Reports pain while sitting and stating he is using suppository with out relief. Reports also noting dark blood in stools.        EXTERNAL RECORDS REVIEWED  Outpatient Notes Patient seen at urgent care in 01/2024 for right shoulder pain    HPI/ROS  LIMITATION TO HISTORY   Select: : None  OUTSIDE HISTORIAN(S):  None    Lizandro Vences is a 33 y.o. male who presents for evaluation of rectal pain.  The patient notes that he was diagnosed with external hemorrhoids 10 years ago.  He had some blood in his stool at that time.  He was prescribed suppositories and symptoms got better.  Symptoms came on again approximately 2 years ago and he was again prescribed suppositories and then symptoms away.  Approximately a month ago, he developed the same symptoms.  He had pain with passing a bowel movement.  He was having some blood in his stool but he states that this stopped 2 weeks ago.  He comes to the emergency room today as he has had persistent rectal pain all day.  He states that this is different as it had just been pain with passing a bowel movement.  He states that for the past week he has been very constipated and has had large very hard stools.  He has been trying to have a bowel movement twice a day.  He had a large bowel movement this morning and has had pain in his rectum since then.  He denies any fever, nausea, vomiting, abdominal pain.  He has no chronic medical problems.  He takes no daily medications.  He has no history of cancer, inflammatory bowel disease.  He does not have a primary care doctor here in [place] as he recently moved here 6 months ago.  He denies any history of drug use, HIV.    PAST MEDICAL HISTORY   has a past medical history of Patient denies medical problems.    SURGICAL HISTORY  patient denies any surgical history    FAMILY HISTORY  History reviewed. No pertinent family history.    SOCIAL  "HISTORY  Social History     Tobacco Use    Smoking status: Never    Smokeless tobacco: Never   Vaping Use    Vaping Use: Never used   Substance and Sexual Activity    Alcohol use: Yes    Drug use: Not Currently    Sexual activity: Not on file       CURRENT MEDICATIONS  Home Medications       Reviewed by Erin Owens R.N. (Registered Nurse) on 02/15/24 at 1430  Med List Status: Not Addressed     Medication Last Dose Status   cyclobenzaprine (FLEXERIL) 10 mg Tab  Active   methylPREDNISolone (MEDROL DOSEPAK) 4 MG Tablet Therapy Pack  Active                    ALLERGIES  Allergies   Allergen Reactions    Shrimp Flavor Swelling     Patient states a shrimp allergy.        PHYSICAL EXAM  VITAL SIGNS: BP (!) 149/98   Pulse 63   Temp 36.7 °C (98 °F) (Temporal)   Resp 14   Ht 1.753 m (5' 9\")   Wt 112 kg (246 lb 7.6 oz)   SpO2 98%   BMI 36.40 kg/m²      Constitutional: Well developed, Well nourished, No acute respiratory distress, Non-toxic appearance.   Thorax & Lungs: Nonlabored respirations  Abdomen: Soft nontender   Rectal: With male nurse chaperone, at the 12 o'clock position opposite the perineal body, there is an anal fissure as well as a nonthrombosed external hemorrhoid.  Rectal exam performed and there is no fluctuance or induration to suggest abscess, no melena or gross blood.  Neurologic: Alert & oriented x 3. No focal deficits noted.     COURSE & MEDICAL DECISION MAKING    ED Observation Status? No; Patient does not meet criteria for ED Observation.     INITIAL ASSESSMENT, COURSE AND PLAN  Care Narrative:   This is a 33-year-old male with history of hemorrhoids who is presenting for evaluation of rectal pain.  He has had rectal pain only associated with bowel movements for the past month but today the rectal pain has been persistent after he had a large hard bowel movement earlier this morning.  He has no abdominal pain, fever, vomiting.  He was having some blood in his stools but he has not for the " past 2 weeks.  He is not on any blood thinners.    On physical exam, he was found to have an anal fissure which is likely related to being constipated over the past week.  He also has a nonthrombosed external hemorrhoid.  There is no blood in the stool on physical exam today.  There is no evidence of perirectal abscess on exam.  He has no chronic diarrhea, blood in stool, no family history of bowel disease and no history of inflammatory bowel disease himself.     We discussed the management of anal fissures including sitz bath's at least 3 times daily as well as after every bowel movement.  Also discussed anal hygiene and importance of it.  He will be prescribed lidocaine jelly as well as hydrocortisone suppositories for pain.  Advised to take Tylenol and Motrin for pain as well.  I sent a prescription for fiber supplement as well as MiraLAX to help with constipation.  He is advised to follow-up with a primary care doctor and I have placed a referral for a primary care doctor and have asked an ER  to help him follow-up with her primary care doctor.  I do think that he would benefit from routine medical care and to ensure that this is not become a chronic issue.  I am also concerned that he has had some intermittent blood in his stool which could be related to hemorrhoids but I do think that he needs to have further evaluation to ensure that this is not related to malignancy.  I did offer to do labs today however patient declines and prefers to just follow-up with a primary care doctor.  He is advised to return for any worsening pain, fever, abdominal pain, blood in the stool or any other concerns.  He is agreeable to discharge plan with no further questions.    HTN/IDDM FOLLOW UP:  The patient is referred to a primary physician for blood pressure management, diabetic screening, and for all other preventive health concerns      ADDITIONAL PROBLEM LIST  None  DISPOSITION AND DISCUSSIONS  I have discussed  management of the patient with the following physicians and ISATU's:    None    Discussion of management with other QHP or appropriate source(s): None     Escalation of care considered, and ultimately not performed:blood analysis    Barriers to care at this time, including but not limited to: Patient does not have established PCP.     Decision tools and prescription drugs considered including, but not limited to:  None .    The patient will return for new or worsening symptoms and is stable at the time of discharge.    The patient is referred to a primary physician for blood pressure management, diabetic screening, and for all other preventative health concerns.      DISPOSITION:  Patient will be discharged home in stable condition.    FOLLOW UP:  [facility], Emergency Dept  [address]  [place] [address] [phone]        Primary care provider            OUTPATIENT MEDICATIONS:  New Prescriptions    HYDROCORTISONE (ANUSOL-HC) 25 MG SUPPOS    Insert 1 Suppository into the rectum every 12 hours.    LIDOCAINE, ANORECTAL, 5 % CREAM    Apply 1 Application topically every four hours as needed (anal pain).    POLYETHYLENE GLYCOL/LYTES (MIRALAX) PACK    Take 1 Packet by mouth every day.    PSYLLIUM 25 % PACKET    Take 1 Packet by mouth 2 times a day.       FINAL DIAGNOSIS  1. Anal fissure    2. External hemorrhoid           Electronically signed by: Evon Rojas M.D., 2/15/2024 3:22 PM

## 2024-02-15 NOTE — ED NOTES
Pt states he has rectal bleeding after bowel movements. Pt states it hurts when he sits down. Pt states he does not have a wound near his rectum.

## 2024-02-15 NOTE — ED TRIAGE NOTES
Chief Complaint   Patient presents with    Hemorrhoids     Reports pain while sitting and stating he is using suppository with out relief. Reports also noting dark blood in stools.      Physical Exam  Pulmonary:      Effort: Pulmonary effort is normal.   Skin:     General: Skin is warm and dry.   Neurological:      Mental Status: He is alert.

## 2024-04-26 ENCOUNTER — TELEPHONE (OUTPATIENT)
Dept: HEALTH INFORMATION MANAGEMENT | Facility: OTHER | Age: 34
End: 2024-04-26
Payer: COMMERCIAL

## 2024-08-14 ENCOUNTER — HOSPITAL ENCOUNTER (EMERGENCY)
Facility: MEDICAL CENTER | Age: 34
End: 2024-08-14
Attending: EMERGENCY MEDICINE
Payer: COMMERCIAL

## 2024-08-14 VITALS
HEIGHT: 69 IN | DIASTOLIC BLOOD PRESSURE: 78 MMHG | BODY MASS INDEX: 35.76 KG/M2 | TEMPERATURE: 99 F | WEIGHT: 241.4 LBS | RESPIRATION RATE: 16 BRPM | HEART RATE: 80 BPM | SYSTOLIC BLOOD PRESSURE: 127 MMHG | OXYGEN SATURATION: 98 %

## 2024-08-14 DIAGNOSIS — L02.416 CUTANEOUS ABSCESS OF LEFT LOWER EXTREMITY: ICD-10-CM

## 2024-08-14 PROCEDURE — 304217 HCHG IRRIGATION SYSTEM

## 2024-08-14 PROCEDURE — 303977 HCHG I & D

## 2024-08-14 PROCEDURE — 700111 HCHG RX REV CODE 636 W/ 250 OVERRIDE (IP): Mod: JZ | Performed by: EMERGENCY MEDICINE

## 2024-08-14 PROCEDURE — 99282 EMERGENCY DEPT VISIT SF MDM: CPT

## 2024-08-14 RX ADMIN — LIDOCAINE HYDROCHLORIDE 5 ML: 10 INJECTION, SOLUTION EPIDURAL; INFILTRATION; INTRACAUDAL; PERINEURAL at 09:30

## 2024-08-14 NOTE — Clinical Note
Lizandro Ru was seen and treated in our emergency department on 8/14/2024.  He may return to work on 08/16/2024.       If you have any questions or concerns, please don't hesitate to call.      Kelly Landa D.O.

## 2024-08-14 NOTE — ED NOTES
Pt ambulated to room from [facility]. Changed into gown. Connected to monitor. Agree with triage note. Chart up for ERP. Call light in reach.

## 2024-08-14 NOTE — ED PROVIDER NOTES
ED Provider Note    CHIEF COMPLAINT  Chief Complaint   Patient presents with    Groin Pain     L groin swelling- pt attempted to squeeze area to see if anything would drain, pain worsened, no drainage   X2 days    Generalized Body Aches     X 2 days       EXTERNAL RECORDS REVIEWED  Patient's last encounter was an ED visit in February of this year he was seen for anal fissure and external hemorrhoid.    HPI/ROS  LIMITATION TO HISTORY   Select: : None  OUTSIDE HISTORIAN(S):  None    Lizandro Vences is a 33 y.o. male who presents to the emergency department with a chief complaint of an area on his left upper anterior thigh that he noticed 2 days ago.  He has had some associated body aches but no fever.  His sister is a nurse.  She suggested that he try to pop it which she tried but it was too painful.  He presents here with ongoing symptoms.  He is otherwise healthy with no past medical history and no allergies.  He takes no medications.  No prior surgeries.  His immunizations including tetanus are up-to-date.    PAST MEDICAL HISTORY   has a past medical history of Patient denies medical problems.    SURGICAL HISTORY  patient denies any surgical history    FAMILY HISTORY  No family history on file.    SOCIAL HISTORY  Social History     Tobacco Use    Smoking status: Never    Smokeless tobacco: Never   Vaping Use    Vaping status: Never Used   Substance and Sexual Activity    Alcohol use: Yes    Drug use: Not Currently    Sexual activity: Not on file       CURRENT MEDICATIONS  Home Medications       Reviewed by Herminia Jackson R.N. (Registered Nurse) on 08/14/24 at 0821  Med List Status: Partial     Medication Last Dose Status   cyclobenzaprine (FLEXERIL) 10 mg Tab  Active   hydrocortisone (ANUSOL-HC) 25 MG Suppos  Active   Lidocaine, Anorectal, 5 % Cream  Active   methylPREDNISolone (MEDROL DOSEPAK) 4 MG Tablet Therapy Pack  Active   polyethylene glycol/lytes (MIRALAX) Pack  Active   psyllium 25 % packet  Active          "           ALLERGIES  Allergies   Allergen Reactions    Shrimp Flavor Swelling     Patient states a shrimp allergy.        PHYSICAL EXAM  VITAL SIGNS: /78   Pulse 80   Temp 37.2 °C (99 °F) (Temporal)   Resp 16   Ht 1.753 m (5' 9\")   Wt 110 kg (241 lb 6.5 oz)   SpO2 98%   BMI 35.65 kg/m²    Vitals reviewed.  Constitutional: Patient is oriented to person, place, and time. Appears well-developed and well-nourished. Mild distress.    Head: Normocephalic and atraumatic.   Mouth/Throat: Oropharynx is clear  Eyes: Conjunctivae are normal. Pupils are equal, round.  Neck: Normal range of motion. Neck supple.   Cardiovascular: Normal rate, regular rhythm and normal heart sounds.   Pulmonary/Chest: Effort normal and breath sounds normal. No respiratory distress, no wheezes, rhonchi, or rales.   Abdominal: Soft. Bowel sounds are normal. There is no tenderness.   Musculoskeletal: No edema and no tenderness.   Neurological: Normal gait. No focal deficits.   Skin: Skin is warm and dry. No erythema. No pallor.  There is an area on the proximal, anterior left thigh of erythema that is approximately 6 x 3 cm.  There is a central area of induration.  Psychiatric: Patient has a normal mood and affect.     EKG/LABS    RADIOLOGY/PROCEDURES     Incision and Drainage Procedure    Indication: Abscess    Location: Left anterior upper thigh    Procedure: The patient was positioned appropriately and the skin over the incision site was prepped with betadine and draped in a sterile fashion. Local anesthesia was obtained by infiltration using 1% Lidocaine without epinephrine.  An incision was then made over the apex of the lesion and approximately 1 cc of thick and purulent material was expressed. Loculations were broken up using forceps and more of the material was able to be expressed. The drainage cavity was then irrigated. The patient’s tetanus status was up to date and did not require a booster dose.    The patient tolerated the " procedure well.    Complications: None      COURSE & MEDICAL DECISION MAKING    ASSESSMENT, COURSE AND PLAN  Care Narrative:     This is an overall well-appearing, previously healthy 33-year-old male who presents with a cutaneous abscess to the left upper anterior thigh.  Bedside ultrasound, reveals cobblestoning as well as a small area of fluid collection that I do think is amenable to incision and drainage.  I advised the patient of this and he is agreeable.    Patient tolerated incision and drainage well.  He is given aftercare instructions and return precautions.  Well-appearing overall and nontoxic.  He is provided a work note per his request.  He is discharged home in stable condition.    ADDITIONAL PROBLEMS MANAGED    DISPOSITION AND DISCUSSIONS  I have discussed management of the patient with the following physicians and ISATU's:  None    Discussion of management with other QHP or appropriate source(s): None     Escalation of care considered, and ultimately not performed: None    Barriers to care at this time, including but not limited to: Patient does not have established PCP.     Decision tools and prescription drugs considered including, but not limited to: None.    FINAL DIAGNOSIS  1. Cutaneous abscess of left lower extremity    Left anterior proximal thigh     Electronically signed by: Kelly Landa D.O., 8/14/2024 8:50 AM

## 2024-08-14 NOTE — ED TRIAGE NOTES
Chief Complaint   Patient presents with    Groin Pain     L groin swelling- pt attempted to squeeze area to see if anything would drain, pain worsened, no drainage   X2 days    Generalized Body Aches     X 2 days

## 2024-10-29 ENCOUNTER — OFFICE VISIT (OUTPATIENT)
Dept: URGENT CARE | Facility: CLINIC | Age: 34
End: 2024-10-29
Payer: COMMERCIAL

## 2024-10-29 VITALS
OXYGEN SATURATION: 97 % | TEMPERATURE: 98.3 F | HEART RATE: 66 BPM | SYSTOLIC BLOOD PRESSURE: 124 MMHG | WEIGHT: 232.6 LBS | DIASTOLIC BLOOD PRESSURE: 76 MMHG | HEIGHT: 69 IN | RESPIRATION RATE: 16 BRPM | BODY MASS INDEX: 34.45 KG/M2

## 2024-10-29 DIAGNOSIS — B34.9 NONSPECIFIC SYNDROME SUGGESTIVE OF VIRAL ILLNESS: ICD-10-CM

## 2024-10-29 DIAGNOSIS — R11.0 NAUSEA: ICD-10-CM

## 2024-10-29 RX ORDER — ONDANSETRON 4 MG/1
4 TABLET, ORALLY DISINTEGRATING ORAL EVERY 6 HOURS PRN
Qty: 15 TABLET | Refills: 0 | Status: SHIPPED | OUTPATIENT
Start: 2024-10-29

## 2024-10-29 ASSESSMENT — ENCOUNTER SYMPTOMS
CHILLS: 0
VOMITING: 1
NAUSEA: 1
FEVER: 0